# Patient Record
Sex: MALE | Race: ASIAN | NOT HISPANIC OR LATINO | URBAN - METROPOLITAN AREA
[De-identification: names, ages, dates, MRNs, and addresses within clinical notes are randomized per-mention and may not be internally consistent; named-entity substitution may affect disease eponyms.]

---
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
## 2025-05-13 NOTE — ED PEDIATRIC NURSE NOTE - CHPI ED NUR SYMPTOMS POS
Hope its' okay, I added a medrol dose pack and cyclopentolate. Thank you!
ingestion of Tylenol/ACTING OUT BEHAVIORS